# Patient Record
Sex: MALE | Race: BLACK OR AFRICAN AMERICAN | Employment: FULL TIME | URBAN - METROPOLITAN AREA
[De-identification: names, ages, dates, MRNs, and addresses within clinical notes are randomized per-mention and may not be internally consistent; named-entity substitution may affect disease eponyms.]

---

## 2017-01-02 ENCOUNTER — HOSPITAL ENCOUNTER (EMERGENCY)
Age: 57
Discharge: HOME | End: 2017-01-02
Attending: EMERGENCY MEDICINE
Payer: COMMERCIAL

## 2017-01-02 VITALS
OXYGEN SATURATION: 97 % | BODY MASS INDEX: 25.03 KG/M2 | RESPIRATION RATE: 16 BRPM | HEART RATE: 86 BPM | WEIGHT: 195 LBS | TEMPERATURE: 98.1 F | DIASTOLIC BLOOD PRESSURE: 87 MMHG | SYSTOLIC BLOOD PRESSURE: 127 MMHG | HEIGHT: 74 IN

## 2017-01-02 DIAGNOSIS — B34.9 VIRAL ILLNESS: ICD-10-CM

## 2017-01-02 DIAGNOSIS — J06.9 ACUTE UPPER RESPIRATORY INFECTION: Primary | ICD-10-CM

## 2017-01-02 DIAGNOSIS — K29.90 GASTRITIS AND DUODENITIS: ICD-10-CM

## 2017-01-02 PROCEDURE — 99282 EMERGENCY DEPT VISIT SF MDM: CPT

## 2017-01-02 RX ORDER — GUAIFENESIN 600 MG/1
600 TABLET, EXTENDED RELEASE ORAL 2 TIMES DAILY
Qty: 20 TAB | Refills: 0 | Status: SHIPPED | OUTPATIENT
Start: 2017-01-02

## 2017-01-02 RX ORDER — ALBUTEROL SULFATE 90 UG/1
1-2 AEROSOL, METERED RESPIRATORY (INHALATION)
Qty: 1 INHALER | Refills: 1 | Status: SHIPPED | OUTPATIENT
Start: 2017-01-02

## 2017-01-02 NOTE — ED NOTES
Pt for D/C home Pt accepted plan of care and left unit steady gait. Patient (s)  given copy of dc instructions and 2 script(s). Patient(s)  verbalized understanding of instructions and script (s). Patient given a current medication reconciliation form and verbalized understanding of their medications. Patient (s) verbalized understanding of the importance of discussing medications with  his or her physician or clinic when they follow up. Patient alert and oriented and in no acute distress. Pt verbalizes pain scale of 3 out of 10. Patient discharged home ambulatory with self.

## 2017-01-02 NOTE — ED PROVIDER NOTES
Patient is a 55 y.o. male presenting with abdominal pain and cough. The history is provided by the patient. Abdominal Pain    This is a new (Pt reports generalized abdominal cramping, diarrhea, nasal congestion, rhinorrhea x 4 days. Denies fever, chills, n/v, blood in stool. Endorsing eating and drinking today.) problem. The current episode started more than 2 days ago. The problem occurs constantly. The problem has not changed since onset. The pain is located in the generalized abdominal region. The pain is at a severity of 8/10. Associated symptoms include diarrhea and myalgias. Pertinent negatives include no anorexia, no fever, no belching, no flatus, no hematochezia, no melena, no nausea, no vomiting, no constipation, no dysuria, no frequency, no hematuria, no headaches, no arthralgias, no chest pain and no back pain. Nothing worsens the pain. The pain is relieved by nothing. Cough   This is a new problem. The current episode started more than 2 days ago. The problem occurs hourly. The problem has not changed since onset. The cough is productive of sputum. There has been no fever. Associated symptoms include rhinorrhea and myalgias. Pertinent negatives include no chest pain, no chills, no sweats, no weight loss, no eye redness, no ear congestion, no ear pain, no headaches, no sore throat, no shortness of breath, no wheezing, no nausea and no vomiting. He has tried nothing for the symptoms. He is a smoker. His past medical history is significant for asthma. His past medical history does not include pneumonia. Past Medical History:   Diagnosis Date    Asthma     Sleep disorder      sleep apnea       Past Surgical History:   Procedure Laterality Date    Hx orthopaedic       left ankle         History reviewed. No pertinent family history.     Social History     Social History    Marital status: UNKNOWN     Spouse name: N/A    Number of children: N/A    Years of education: N/A     Occupational History  Not on file. Social History Main Topics    Smoking status: Current Some Day Smoker     Packs/day: 0.50    Smokeless tobacco: Not on file    Alcohol use Yes      Comment: weekends    Drug use: No    Sexual activity: Yes     Partners: Female     Birth control/ protection: None     Other Topics Concern    Not on file     Social History Narrative    2/2016: He lives with his wife,  He works as a mailman. ALLERGIES: Review of patient's allergies indicates no known allergies. Review of Systems   Constitutional: Negative for activity change, chills, fatigue, fever and weight loss. HENT: Positive for congestion and rhinorrhea. Negative for dental problem, drooling, ear discharge, ear pain, facial swelling, hearing loss, nosebleeds, postnasal drip, sinus pressure, sore throat, trouble swallowing and voice change. Eyes: Negative. Negative for pain and redness. Respiratory: Positive for cough. Negative for apnea, chest tightness, shortness of breath, wheezing and stridor. Cardiovascular: Negative. Negative for chest pain. Gastrointestinal: Positive for abdominal pain and diarrhea. Negative for anorexia, constipation, flatus, hematochezia, melena, nausea and vomiting. Genitourinary: Negative. Negative for dysuria, frequency and hematuria. Musculoskeletal: Positive for myalgias. Negative for arthralgias and back pain. Skin: Negative. Negative for rash. Neurological: Negative for headaches. Psychiatric/Behavioral: Negative. Vitals:    01/02/17 0954   BP: 127/87   Pulse: 86   Resp: 16   Temp: 98.1 °F (36.7 °C)   SpO2: 97%   Weight: 88.5 kg (195 lb)   Height: 6' 2\" (1.88 m)            Physical Exam   Constitutional: He is oriented to person, place, and time. He appears well-developed and well-nourished. No distress. HENT:   Head: Normocephalic and atraumatic.    Right Ear: Hearing, tympanic membrane, external ear and ear canal normal.   Left Ear: Hearing, tympanic membrane, external ear and ear canal normal.   Nose: Mucosal edema and rhinorrhea present. Right sinus exhibits no maxillary sinus tenderness and no frontal sinus tenderness. Left sinus exhibits no maxillary sinus tenderness and no frontal sinus tenderness. Mouth/Throat: Uvula is midline, oropharynx is clear and moist and mucous membranes are normal. No oropharyngeal exudate, posterior oropharyngeal edema or posterior oropharyngeal erythema. Eyes: Conjunctivae and EOM are normal. Pupils are equal, round, and reactive to light. Neck: Normal range of motion. Neck supple. Pulmonary/Chest: Effort normal. No accessory muscle usage. No respiratory distress. He has no decreased breath sounds. He has no wheezes. He has no rhonchi. He has no rales. Abdominal: Normal appearance. There is generalized tenderness. There is no rigidity, no rebound, no guarding, no CVA tenderness, no tenderness at McBurney's point and negative De's sign. Neurological: He is alert and oriented to person, place, and time. No cranial nerve deficit. Skin: Skin is warm, dry and intact. He is not diaphoretic. No pallor. Psychiatric: He has a normal mood and affect. His speech is normal and behavior is normal. Judgment and thought content normal.   Nursing note and vitals reviewed. MDM  Number of Diagnoses or Management Options  Acute upper respiratory infection:   Gastritis and duodenitis:   Viral illness:   Diagnosis management comments: DDx: URI, viral illness, diarrhea, flu    LABORATORY TESTS:  No results found for this or any previous visit (from the past 12 hour(s)). IMAGING RESULTS:  No orders to display    MEDICATIONS GIVEN:  Medications - No data to display    IMPRESSION:  Acute upper respiratory infection  (primary encounter diagnosis)  Viral illness  Gastritis and duodenitis    PLAN:  1.  Current Discharge Medication List    START taking these medications    guaiFENesin ER (MUCINEX) 600 mg ER tablet  Take 1 Tab by mouth two (2) times a day. Qty: 20 Tab Refills: 0      CONTINUE these medications which have CHANGED    albuterol (PROVENTIL HFA, VENTOLIN HFA, PROAIR HFA) 90 mcg/actuation inhaler   Take 1-2 Puffs by inhalation every four (4) hours as needed for Wheezing. Qty: 1 Inhaler Refills: 1      CONTINUE these medications which have NOT CHANGED    methocarbamol (ROBAXIN) 500 mg tablet  Take 1 Tab by mouth four (4) times daily. Qty: 30 Tab Refills: 0    traMADol (ULTRAM) 50 mg tablet  Take 1 Tab by mouth every six (6) hours as needed for Pain. Max Daily Amount: 200 mg. No driving on medication  Qty: 16 Tab Refills: 0    fluticasone (FLONASE) 50 mcg/actuation nasal spray  2 Sprays by Both Nostrils route nightly. 2. Follow-up Information     Follow up With Details Comments Contact Jessica Hough MD Schedule an appointment as soon as possible for a   visit in 1 week As needed, If symptoms worsen [facility] [address]  [phone]        Return to ED if worse               Patient Progress  Patient progress: stable    ED Course       Procedures      10:38 AM  I have discussed with patient their diagnosis, treatment, and follow up plan. The patient agrees to follow up as outlined in discharge paperwork and also to return to the ED with any worsening.  Alonso Collier PA-C

## 2017-01-02 NOTE — DISCHARGE INSTRUCTIONS
Upper Respiratory Infection (Cold): Care Instructions  Your Care Instructions    An upper respiratory infection, or URI, is an infection of the nose, sinuses, or throat. URIs are spread by coughs, sneezes, and direct contact. The common cold is the most frequent kind of URI. The flu and sinus infections are other kinds of URIs. Almost all URIs are caused by viruses. Antibiotics won't cure them. But you can treat most infections with home care. This may include drinking lots of fluids and taking over-the-counter pain medicine. You will probably feel better in 4 to 10 days. The doctor has checked you carefully, but problems can develop later. If you notice any problems or new symptoms, get medical treatment right away. Follow-up care is a key part of your treatment and safety. Be sure to make and go to all appointments, and call your doctor if you are having problems. It's also a good idea to know your test results and keep a list of the medicines you take. How can you care for yourself at home? · To prevent dehydration, drink plenty of fluids, enough so that your urine is light yellow or clear like water. Choose water and other caffeine-free clear liquids until you feel better. If you have kidney, heart, or liver disease and have to limit fluids, talk with your doctor before you increase the amount of fluids you drink. · Take an over-the-counter pain medicine, such as acetaminophen (Tylenol), ibuprofen (Advil, Motrin), or naproxen (Aleve). Read and follow all instructions on the label. · Before you use cough and cold medicines, check the label. These medicines may not be safe for young children or for people with certain health problems. · Be careful when taking over-the-counter cold or flu medicines and Tylenol at the same time. Many of these medicines have acetaminophen, which is Tylenol. Read the labels to make sure that you are not taking more than the recommended dose.  Too much acetaminophen (Tylenol) can be harmful. · Get plenty of rest.  · Do not smoke or allow others to smoke around you. If you need help quitting, talk to your doctor about stop-smoking programs and medicines. These can increase your chances of quitting for good. When should you call for help? Call 911 anytime you think you may need emergency care. For example, call if:  · You have severe trouble breathing. Call your doctor now or seek immediate medical care if:  · You seem to be getting much sicker. · You have new or worse trouble breathing. · You have a new or higher fever. · You have a new rash. Watch closely for changes in your health, and be sure to contact your doctor if:  · You have a new symptom, such as a sore throat, an earache, or sinus pain. · You cough more deeply or more often, especially if you notice more mucus or a change in the color of your mucus. · You do not get better as expected. Where can you learn more? Go to http://otoniel-maggy.info/. Enter P420 in the search box to learn more about \"Upper Respiratory Infection (Cold): Care Instructions. \"  Current as of: June 30, 2016  Content Version: 11.1  © 3234-8040 [employer]. Care instructions adapted under license by [employer] (which disclaims liability or warranty for this information). If you have questions about a medical condition or this instruction, always ask your healthcare professional. [place] [address] any warranty or liability for your use of this information. Gastritis: Care Instructions  Your Care Instructions    Gastritis is a sore and upset stomach. It happens when something irritates the stomach lining. Many things can cause it. These include an infection such as the flu or something you ate or drank. Medicines or a sore on the lining of the stomach (ulcer) also can cause it. Your belly may bloat and ache. You may belch, vomit, and feel sick to your stomach.   You should be able to relieve the problem by taking medicine. And it may help to change your diet. If gastritis lasts, your doctor may prescribe medicine. Follow-up care is a key part of your treatment and safety. Be sure to make and go to all appointments, and call your doctor if you are having problems. It's also a good idea to know your test results and keep a list of the medicines you take. How can you care for yourself at home? · If your doctor prescribed antibiotics, take them as directed. Do not stop taking them just because you feel better. You need to take the full course of antibiotics. · Be safe with medicines. If your doctor prescribed medicine to decrease stomach acid, take it as directed. Call your doctor if you think you are having a problem with your medicine. · Do not take any other medicine, including over-the-counter pain relievers, without talking to your doctor first.  · If your doctor recommends over-the-counter medicine to reduce stomach acid, such as Pepcid AC, Prilosec, Tagamet HB, or Zantac 75, follow the directions on the label. · Drink plenty of fluids (enough so that your urine is light yellow or clear like water) to prevent dehydration. Choose water and other caffeine-free clear liquids. If you have kidney, heart, or liver disease and have to limit fluids, talk with your doctor before you increase the amount of fluids you drink. · Limit how much alcohol you drink. · Avoid coffee, tea, cola drinks, chocolate, and other foods with caffeine. They increase stomach acid. When should you call for help? Call 911 anytime you think you may need emergency care. For example, call if:  · You vomit blood or what looks like coffee grounds. · You pass maroon or very bloody stools. Call your doctor now or seek immediate medical care if:  · You start breathing fast and have not produced urine in the last 8 hours. · You cannot keep fluids down.   Watch closely for changes in your health, and be sure to contact your doctor if:  · You do not get better as expected. Where can you learn more? Go to http://otoniel-maggy.info/. Enter [id number] in the search box to learn more about \"Gastritis: Care Instructions. \"  Current as of: August 9, 2016  Content Version: 11.1  © 2281-7441 [employer]. Care instructions adapted under license by [employer] (which disclaims liability or warranty for this information). If you have questions about a medical condition or this instruction, always ask your healthcare professional. [address] any warranty or liability for your use of this information.

## 2017-01-02 NOTE — LETTER
[facility] EMERGENCY DEPT 
[address] [facility] [address] [id number] [phone] Work/School Note Date: 1/2/2017 To Whom It May concern: 
 
Alexis Cheung was seen and treated today in the emergency room by the following provider(s): 
Attending Provider: Rosa Maria Alvarado MD 
Physician Assistant: Neha Case PA-C. Alexis Cheung may return to work on 1/4/2017. Sincerely, Neha Case PA-C

## 2017-01-02 NOTE — ED NOTES
Pt c/o N/V/D and nery randolph since Saturday. Emergency Department Nursing Plan of Care       The Nursing Plan of Care is developed from the Nursing assessment and Emergency Department Attending provider initial evaluation. The plan of care may be reviewed in the ED Provider note.     The Plan of Care was developed with the following considerations:   Patient / Family readiness to learn indicated by:verbalized understanding  Persons(s) to be included in education: patient  Barriers to Learning/Limitations:No    Signed     Junior Tad RN    1/2/2017   10:01 AM

## 2017-11-29 ENCOUNTER — HOSPITAL ENCOUNTER (EMERGENCY)
Age: 57
Discharge: HOME | End: 2017-11-29
Attending: EMERGENCY MEDICINE
Payer: COMMERCIAL

## 2017-11-29 ENCOUNTER — APPOINTMENT (OUTPATIENT)
Dept: GENERAL RADIOLOGY | Age: 57
End: 2017-11-29
Attending: EMERGENCY MEDICINE
Payer: COMMERCIAL

## 2017-11-29 VITALS
DIASTOLIC BLOOD PRESSURE: 86 MMHG | OXYGEN SATURATION: 97 % | BODY MASS INDEX: 26.05 KG/M2 | HEIGHT: 74 IN | SYSTOLIC BLOOD PRESSURE: 129 MMHG | RESPIRATION RATE: 18 BRPM | WEIGHT: 203 LBS | HEART RATE: 88 BPM | TEMPERATURE: 98.4 F

## 2017-11-29 DIAGNOSIS — S49.92XA SHOULDER INJURY, LEFT, INITIAL ENCOUNTER: Primary | ICD-10-CM

## 2017-11-29 DIAGNOSIS — S40.012A CONTUSION OF LEFT SHOULDER, INITIAL ENCOUNTER: ICD-10-CM

## 2017-11-29 PROCEDURE — 99283 EMERGENCY DEPT VISIT LOW MDM: CPT

## 2017-11-29 PROCEDURE — A4565 SLINGS: HCPCS

## 2017-11-29 PROCEDURE — 74011250637 HC RX REV CODE- 250/637: Performed by: EMERGENCY MEDICINE

## 2017-11-29 PROCEDURE — 73030 X-RAY EXAM OF SHOULDER: CPT

## 2017-11-29 RX ORDER — IBUPROFEN 800 MG/1
800 TABLET ORAL
Qty: 20 TAB | Refills: 0 | Status: SHIPPED | OUTPATIENT
Start: 2017-11-29 | End: 2017-12-06

## 2017-11-29 RX ORDER — OXYCODONE AND ACETAMINOPHEN 5; 325 MG/1; MG/1
2 TABLET ORAL
Status: COMPLETED | OUTPATIENT
Start: 2017-11-29 | End: 2017-11-29

## 2017-11-29 RX ORDER — IBUPROFEN 400 MG/1
800 TABLET ORAL
Status: COMPLETED | OUTPATIENT
Start: 2017-11-29 | End: 2017-11-29

## 2017-11-29 RX ADMIN — OXYCODONE HYDROCHLORIDE AND ACETAMINOPHEN 2 TABLET: 5; 325 TABLET ORAL at 05:30

## 2017-11-29 RX ADMIN — IBUPROFEN 800 MG: 400 TABLET, FILM COATED ORAL at 05:30

## 2017-11-29 NOTE — LETTER
[facility] EMERGENCY DEPT 
[address] [facility] [address] [address] 
[phone] Work/School Note Date: 11/29/2017 To Whom It May concern: 
 
Duran Ugarte was seen and treated today in the emergency room by the following provider(s): 
Attending Provider: Claribel Holguin MD.   
 
Duran Ugarte may return to work on 12/1/17. Sincerely, Claribel Holguin MD

## 2017-11-29 NOTE — DISCHARGE INSTRUCTIONS
IF YOU STILL HURT IN 2 WEEKS YOU NEED REPEAT XRAYS     Bruises: Care Instructions  Your Care Instructions    Bruises occur when small blood vessels under the skin tear or rupture, most often from a twist, bump, or fall. Blood leaks into tissues under the skin and causes a black-and-blue spot that often turns colors, including purplish black, reddish blue, or yellowish green, as the bruise heals. Bruises hurt, but most are not serious and will go away on their own within 2 to 4 weeks. Sometimes, gravity causes them to spread down the body. A leg bruise usually will take longer to heal than a bruise on the face or arms. Follow-up care is a key part of your treatment and safety. Be sure to make and go to all appointments, and call your doctor if you are having problems. It's also a good idea to know your test results and keep a list of the medicines you take. How can you care for yourself at home? · Take pain medicines exactly as directed. ¨ If the doctor gave you a prescription medicine for pain, take it as prescribed. ¨ If you are not taking a prescription pain medicine, ask your doctor if you can take an over-the-counter medicine. · Put ice or a cold pack on the area for 10 to 20 minutes at a time. Put a thin cloth between the ice and your skin. · If you can, prop up the bruised area on pillows as much as possible for the next few days. Try to keep the bruise above the level of your heart. When should you call for help? Call your doctor now or seek immediate medical care if:  ? · You have signs of infection, such as:  ¨ Increased pain, swelling, warmth, or redness. ¨ Red streaks leading from the bruise. ¨ Pus draining from the bruise. ¨ A fever. ? · You have a bruise on your leg and signs of a blood clot, such as:  ¨ Increasing redness and swelling along with warmth, tenderness, and pain in the bruised area. ¨ Pain in your calf, back of the knee, thigh, or groin.   ¨ Redness and swelling in your leg or groin. ? · Your pain gets worse. ? Watch closely for changes in your health, and be sure to contact your doctor if:  ? · You do not get better as expected. Where can you learn more? Go to http://otoniel-maggy.info/. Enter [phone] [id number] in the search box to learn more about \"Bruises: Care Instructions. \"  Current as of: March 20, 2017  Content Version: 11.4  © 3053-3351 [employer]. Care instructions adapted under license by [employer] (which disclaims liability or warranty for this information). If you have questions about a medical condition or this instruction, always ask your healthcare professional. [place] [address] any warranty or liability for your use of this information. Shoulder Sprain: Care Instructions  Your Care Instructions    A shoulder sprain occurs when you stretch or tear a ligament in your shoulder. Ligaments are tough tissues that connect one bone to another. A sprain can happen during sports, a fall, or projects around the house. Shoulder sprains usually get better with treatment at home. Follow-up care is a key part of your treatment and safety. Be sure to make and go to all appointments, and call your doctor if you are having problems. It's also a good idea to know your test results and keep a list of the medicines you take. How can you care for yourself at home? · Rest and protect your shoulder. Try to stop or reduce any action that causes pain. · If your doctor gave you a sling or immobilizer, wear it as directed. A sling or immobilizer supports your shoulder and may make you more comfortable. · Put ice or a cold pack on your shoulder for 10 to 20 minutes at a time. Try to do this every 1 to 2 hours for the next 3 days (when you are awake) or until the swelling goes down. Put a thin cloth between the ice and your skin. Some doctors suggest alternating between hot and cold. · Be safe with medicines.  Read and follow all instructions on the label. ¨ If the doctor gave you a prescription medicine for pain, take it as prescribed. ¨ If you are not taking a prescription pain medicine, ask your doctor if you can take an over-the-counter medicine. · For the first day or two after an injury, avoid things that might increase swelling, such as hot showers, hot tubs, or hot packs. · After 2 or 3 days, if your swelling is gone, apply a heating pad set on low or a warm cloth to your shoulder. This helps keep your shoulder flexible. Some doctors suggest that you go back and forth between hot and cold. Put a thin cloth between the heating pad and your skin. · Follow your doctor's or physical therapist's directions for exercises. · Return to your usual level of activity slowly. When should you call for help? Call your doctor now or seek immediate medical care if:  ? · Your pain is worse. ? · You cannot move your shoulder. ? · Your arm is cool or pale or changes color below the shoulder. ? · You have tingling, weakness, or numbness in your arm. ? Watch closely for changes in your health, and be sure to contact your doctor if:  ? · You do not get better as expected. Where can you learn more? Go to http://otoniel-maggy.info/. Enter N356 in the search box to learn more about \"Shoulder Sprain: Care Instructions. \"  Current as of: March 21, 2017  Content Version: 11.4  © 6628-3681 [employer]. Care instructions adapted under license by Note (which disclaims liability or warranty for this information). If you have questions about a medical condition or this instruction, always ask your healthcare professional. [address] any warranty or liability for your use of this information.

## 2017-11-29 NOTE — ED NOTES
Patient (s) was given copy of dc instructions and no paper script(s) and one electronic scripts. Patient (s) has verbalized understanding of instructions and script (s). Patient was given a current medication reconciliation form and verbalized understanding of their medications. Patient (s) has verbalized understanding of the importance of discussing medications with  his or her physician or clinic they will be following up with. Patient alert and oriented and in no acute distress. Patient offered wheelchair from treatment area to hospital entrance, patient declined wheelchair. Patient left ED with self. Patient states his wife will pick him up from the lobby.

## 2017-11-29 NOTE — ED NOTES
Pt in ED w/ complaint of L shoulder and L wrist pain and abrasions X 1 day. Pt states he had a fall on yesterday evening. Pt denies hitting his head, LOC, and taking anything for the pain. Pt is A&O X 4 and appears in no distress. Emergency Department Nursing Plan of Care       The Nursing Plan of Care is developed from the Nursing assessment and Emergency Department Attending provider initial evaluation. The plan of care may be reviewed in the ED Provider note.     The Plan of Care was developed with the following considerations:   Patient / Family readiness to learn indicated by:verbalized understanding  Persons(s) to be included in education: patient  Barriers to Learning/Limitations:No    Signed     Emma Santos RN    11/29/2017   5:37 AM

## 2017-11-29 NOTE — ED PROVIDER NOTES
EMERGENCY DEPARTMENT HISTORY AND PHYSICAL EXAM      Date: 11/29/2017  Patient Name: Britni Kee    History of Presenting Illness     Chief Complaint   Patient presents with    Shoulder Pain     Patient tripped on sidewalk and fell, c/o left shoulder pain       History Provided By: Patient    HPI: Britni Kee, 65 y.o. male with PMHx significant for asthma / sleep apnea, presents ambulatory to the ED with cc of sudden onset L shoulder pain that began after a mechanical ground level fall that occurred at approximately 1800 yesterday. He also reports two small superficial abrasions, one over the dorsal L wrist and the other over the proximal L shoulder. Pt reports that he tripped and fell while walking on the sidewalk and landed directly on his L shoulder. He denies any history of injury to the shoulder. Pt denies taking any OTC analgesics or anti-inflammatories PTA. He states that his pain is non-radiating and is exacerbated with rotation of the shoulder. Pt notes that he is right hand dominant and states that he works as a . Pt specifically denies nausea, vomiting, fever, chills, CP, or SOB. PCP: Charmayne Come, MD    There are no other complaints, changes, or physical findings at this time. Current Outpatient Prescriptions   Medication Sig Dispense Refill    ibuprofen (MOTRIN) 800 mg tablet Take 1 Tab by mouth every six (6) hours as needed for Pain for up to 7 days. 20 Tab 0    albuterol (PROVENTIL HFA, VENTOLIN HFA, PROAIR HFA) 90 mcg/actuation inhaler Take 1-2 Puffs by inhalation every four (4) hours as needed for Wheezing. 1 Inhaler 1    guaiFENesin ER (MUCINEX) 600 mg ER tablet Take 1 Tab by mouth two (2) times a day. 20 Tab 0    methocarbamol (ROBAXIN) 500 mg tablet Take 1 Tab by mouth four (4) times daily. 30 Tab 0    traMADol (ULTRAM) 50 mg tablet Take 1 Tab by mouth every six (6) hours as needed for Pain. Max Daily Amount: 200 mg.  No driving on medication 16 Tab 0    fluticasone (FLONASE) 50 mcg/actuation nasal spray 2 Sprays by Both Nostrils route nightly. Past History     Past Medical History:  Past Medical History:   Diagnosis Date    Asthma     Sleep disorder     sleep apnea       Past Surgical History:  Past Surgical History:   Procedure Laterality Date    HX ORTHOPAEDIC      left ankle       Family History:  History reviewed. No pertinent family history. Social History:  Social History   Substance Use Topics    Smoking status: Current Some Day Smoker     Packs/day: 0.50    Smokeless tobacco: Never Used    Alcohol use Yes      Comment: weekends       Allergies:  No Known Allergies      Review of Systems   Review of Systems   Constitutional: Negative. Negative for fever. HENT: Negative. Negative for drooling, facial swelling and trouble swallowing. Eyes: Negative. Negative for discharge and redness. Respiratory: Negative. Negative for chest tightness, shortness of breath and wheezing. Cardiovascular: Negative. Negative for chest pain. Gastrointestinal: Negative. Negative for abdominal distention, abdominal pain, constipation, diarrhea, nausea and vomiting. Endocrine: Negative. Genitourinary: Negative. Negative for difficulty urinating and dysuria. Musculoskeletal: Positive for arthralgias (L shoulder). Negative for myalgias. Skin: Positive for wound (superficial abrasions, L wrist and L shoulder). Negative for color change and rash. Allergic/Immunologic: Negative. Neurological: Negative. Negative for syncope, facial asymmetry and speech difficulty. Hematological: Negative. Psychiatric/Behavioral: Negative. Negative for agitation and confusion. All other systems reviewed and are negative. Physical Exam   Physical Exam   Constitutional: He is oriented to person, place, and time. He appears well-developed and well-nourished. HENT:   Head: Normocephalic and atraumatic.    Eyes: Conjunctivae and EOM are normal.   Neck: Neck supple. Cardiovascular: Normal rate, regular rhythm and intact distal pulses. Pulmonary/Chest: No accessory muscle usage. No respiratory distress. Abdominal: Soft. Normal appearance. There is no tenderness. Musculoskeletal:   Abduction of the L shoulder limited secondary to pain. Tenderness over the L AC joint with no swelling. No significant swelling or bony tenderness of the L wrist. Distal radial ulnar nerve intact. No appreciable asymmetry between the L and R shoulders. Neurological: He is alert and oriented to person, place, and time. Skin: Skin is warm and dry. Superficial abrasion over the dorsal aspect of the L wrist and over the L AC joint. Psychiatric: He has a normal mood and affect. His behavior is normal. Thought content normal.   Nursing note and vitals reviewed. Diagnostic Study Results     Labs -   No results found for this or any previous visit (from the past 12 hour(s)). Radiologic Studies -   XR SHOULDER LT AP/LAT MIN 2 V   Final Result        Study Result      EXAM:  XR SHOULDER LT AP/LAT MIN 2 V     INDICATION:   Pain after fall.     COMPARISON: None.     FINDINGS: Three views of the left shoulder demonstrate no fracture, dislocation  or other acute abnormality.     IMPRESSION  IMPRESSION:  No acute abnormality. Medical Decision Making   I am the first provider for this patient. I reviewed the vital signs, available nursing notes, past medical history, past surgical history, family history and social history. Vital Signs-Reviewed the patient's vital signs. Patient Vitals for the past 12 hrs:   Temp Pulse Resp BP SpO2   11/29/17 0519 98.4 °F (36.9 °C) 88 18 129/86 97 %       Pulse Oximetry Analysis - 97% on RA    Records Reviewed: Nursing Notes and Old Medical Records    Provider Notes (Medical Decision Making):   DDx sprain strain fracture, abrasion    ED Course:   Initial assessment performed.  The patients presenting problems have been discussed, and they are in agreement with the care plan formulated and outlined with them. I have encouraged them to ask questions as they arise throughout their visit. Progress Note:  5:53 AM  Pt updated on imaging results. He states that his pain has improved and agrees with the current plan of care, states that he is ready for discharge. Written by Josias Silveira, ED Scribe, as dictated by Vickie Perea MD.    Disposition:  DISCHARGE NOTE  5:53 AM  The patient has been re-evaluated and is ready for discharge. Reviewed available results with patient. Counseled pt on diagnosis and care plan. Pt has expressed understanding, and all questions have been answered. Pt agrees with plan and agrees to F/U as recommended, or return to the ED if their sxs worsen. Discharge instructions have been provided and explained to the pt, along with reasons to return to the ED. This note is prepared by Lori Gaytan, acting as Scribe for Vickie Perea MD.    Vickie Perea MD: The scribe's documentation has been prepared under my direction and personally reviewed by me in its entirety. I confirm that the note above accurately reflects all work, treatment, procedures, and medical decision making performed by me. PLAN:  1. Current Discharge Medication List      START taking these medications    Details   ibuprofen (MOTRIN) 800 mg tablet Take 1 Tab by mouth every six (6) hours as needed for Pain for up to 7 days. Qty: 20 Tab, Refills: 0           2. Follow-up Information     Follow up With Details Comments Contact Jesus Manuel Lynch MD Schedule an appointment as soon as possible for a visit  [facility] [id number]  [phone]          Return to ED if worse     Diagnosis     Clinical Impression:   1. Shoulder injury, left, initial encounter    2.  Contusion of left shoulder, initial encounter

## 2021-10-09 ENCOUNTER — APPOINTMENT (OUTPATIENT)
Dept: GENERAL RADIOLOGY | Age: 61
End: 2021-10-09
Attending: EMERGENCY MEDICINE
Payer: COMMERCIAL

## 2021-10-09 ENCOUNTER — HOSPITAL ENCOUNTER (EMERGENCY)
Age: 61
Discharge: HOME | End: 2021-10-10
Attending: EMERGENCY MEDICINE
Payer: COMMERCIAL

## 2021-10-09 VITALS
SYSTOLIC BLOOD PRESSURE: 132 MMHG | HEIGHT: 74 IN | DIASTOLIC BLOOD PRESSURE: 78 MMHG | WEIGHT: 236.33 LBS | OXYGEN SATURATION: 98 % | BODY MASS INDEX: 30.33 KG/M2 | TEMPERATURE: 99.2 F | HEART RATE: 103 BPM | RESPIRATION RATE: 18 BRPM

## 2021-10-09 DIAGNOSIS — R09.82 POSTNASAL DRIP: ICD-10-CM

## 2021-10-09 DIAGNOSIS — J01.00 ACUTE NON-RECURRENT MAXILLARY SINUSITIS: Primary | ICD-10-CM

## 2021-10-09 DIAGNOSIS — R05.9 COUGH: ICD-10-CM

## 2021-10-09 LAB
ALBUMIN SERPL-MCNC: 2.9 G/DL (ref 3.5–5)
ALBUMIN/GLOB SERPL: 0.5 {RATIO} (ref 1.1–2.2)
ALP SERPL-CCNC: 106 U/L (ref 45–117)
ALT SERPL-CCNC: 30 U/L (ref 12–78)
ANION GAP SERPL CALC-SCNC: 2 MMOL/L (ref 5–15)
AST SERPL-CCNC: 33 U/L (ref 15–37)
BASOPHILS # BLD: 0 K/UL (ref 0–0.1)
BASOPHILS NFR BLD: 0 % (ref 0–1)
BILIRUB SERPL-MCNC: 0.2 MG/DL (ref 0.2–1)
BUN SERPL-MCNC: 11 MG/DL (ref 6–20)
BUN/CREAT SERPL: 11 (ref 12–20)
CALCIUM SERPL-MCNC: 8.4 MG/DL (ref 8.5–10.1)
CHLORIDE SERPL-SCNC: 103 MMOL/L (ref 97–108)
CO2 SERPL-SCNC: 32 MMOL/L (ref 21–32)
CREAT SERPL-MCNC: 1 MG/DL (ref 0.7–1.3)
DIFFERENTIAL METHOD BLD: ABNORMAL
EOSINOPHIL # BLD: 0.5 K/UL (ref 0–0.4)
EOSINOPHIL NFR BLD: 4 % (ref 0–7)
ERYTHROCYTE [DISTWIDTH] IN BLOOD BY AUTOMATED COUNT: 19 % (ref 11.5–14.5)
GLOBULIN SER CALC-MCNC: 5.4 G/DL (ref 2–4)
GLUCOSE SERPL-MCNC: 172 MG/DL (ref 65–100)
HCT VFR BLD AUTO: 32.2 % (ref 36.6–50.3)
HGB BLD-MCNC: 9.2 G/DL (ref 12.1–17)
IMM GRANULOCYTES # BLD AUTO: 0.1 K/UL (ref 0–0.04)
IMM GRANULOCYTES NFR BLD AUTO: 1 % (ref 0–0.5)
LYMPHOCYTES # BLD: 2 K/UL (ref 0.8–3.5)
LYMPHOCYTES NFR BLD: 15 % (ref 12–49)
MCH RBC QN AUTO: 20.1 PG (ref 26–34)
MCHC RBC AUTO-ENTMCNC: 28.6 G/DL (ref 30–36.5)
MCV RBC AUTO: 70.5 FL (ref 80–99)
MONOCYTES # BLD: 1.2 K/UL (ref 0–1)
MONOCYTES NFR BLD: 9 % (ref 5–13)
NEUTS SEG # BLD: 9.4 K/UL (ref 1.8–8)
NEUTS SEG NFR BLD: 71 % (ref 32–75)
NRBC # BLD: 0 K/UL (ref 0–0.01)
NRBC BLD-RTO: 0 PER 100 WBC
PLATELET # BLD AUTO: 302 K/UL (ref 150–400)
PMV BLD AUTO: 9 FL (ref 8.9–12.9)
POTASSIUM SERPL-SCNC: 3.7 MMOL/L (ref 3.5–5.1)
PROT SERPL-MCNC: 8.3 G/DL (ref 6.4–8.2)
RBC # BLD AUTO: 4.57 M/UL (ref 4.1–5.7)
RBC MORPH BLD: ABNORMAL
SODIUM SERPL-SCNC: 137 MMOL/L (ref 136–145)
TROPONIN-HIGH SENSITIVITY: 13 NG/L (ref 0–76)
WBC # BLD AUTO: 13.2 K/UL (ref 4.1–11.1)

## 2021-10-09 PROCEDURE — 99284 EMERGENCY DEPT VISIT MOD MDM: CPT

## 2021-10-09 PROCEDURE — 93005 ELECTROCARDIOGRAM TRACING: CPT

## 2021-10-09 PROCEDURE — 71045 X-RAY EXAM CHEST 1 VIEW: CPT

## 2021-10-09 PROCEDURE — 84484 ASSAY OF TROPONIN QUANT: CPT

## 2021-10-09 PROCEDURE — 36415 COLL VENOUS BLD VENIPUNCTURE: CPT

## 2021-10-09 PROCEDURE — 85025 COMPLETE CBC W/AUTO DIFF WBC: CPT

## 2021-10-09 PROCEDURE — 80053 COMPREHEN METABOLIC PANEL: CPT

## 2021-10-09 RX ORDER — PREDNISONE 10 MG/1
TABLET ORAL
Qty: 21 TABLET | Refills: 0 | Status: SHIPPED | OUTPATIENT
Start: 2021-10-09

## 2021-10-09 RX ORDER — AZITHROMYCIN 250 MG/1
TABLET, FILM COATED ORAL
Qty: 6 TABLET | Refills: 0 | Status: SHIPPED | OUTPATIENT
Start: 2021-10-09

## 2021-10-10 LAB
ATRIAL RATE: 101 BPM
CALCULATED P AXIS, ECG09: 58 DEGREES
CALCULATED R AXIS, ECG10: 9 DEGREES
CALCULATED T AXIS, ECG11: 70 DEGREES
DIAGNOSIS, 93000: NORMAL
P-R INTERVAL, ECG05: 144 MS
Q-T INTERVAL, ECG07: 384 MS
QRS DURATION, ECG06: 86 MS
QTC CALCULATION (BEZET), ECG08: 497 MS
VENTRICULAR RATE, ECG03: 101 BPM

## 2021-10-10 NOTE — DISCHARGE INSTRUCTIONS
It was a pleasure taking care of you in our Emergency Department today. We know that when you come to [address] [place] [address], you are entrusting us with your health, comfort, and safety. Our physicians and nurses honor that trust, and truly appreciate the opportunity to care for you and your loved ones. We also value your feedback. If you receive a survey about your Emergency Department experience today, please fill it out. We care about our patients' feedback, and we listen to what you have to say. Please read over your discharge instructions as these contain pertinent information to help you in the healing process. These instructions include a list of prescriptions you were given today. Follow-up information is also noted on your discharge papers. There are attached instructions and information pertaining to the reason why you were seen in the emergency department today. These discharge instructions may not be for exactly why you were here, but may be the closest available instructions that we have. These include important advice for things that you can do at home to feel better, and reasons to return to the emergency department. The evaluation and treatment you received in the emergency department is not always definitive care. If follow-up with your primary care doctor or specialist was recommended, it is important that you make these appointments for follow-up care. You may need further testing, procedures, and/or medications to help you feel better. Further tests may be required that are not available in the emergency department. Failure to make these follow-up appointments may jeopardize your health. The emergency department is here for emergent stabilization and evaluation of life and limb threatening illness and/or injuries.   Further care through a specialist or primary care doctor may be required to assist in your healing and complete your treatment and/or evaluation. We may not always be able to make a diagnosis in the emergency department, or things may change that will alter your diagnosis. Our primary goal is to ensure that nothing serious is occurring and that you are stable to continue your treatment and evaluation at home as an outpatient. Of course, if things change, and you feel worse, you are always encouraged to return to the emergency department for re-evaluation. Lab Results Today:  Recent Results (from the past 8 hour(s))   CBC WITH AUTOMATED DIFF    Collection Time: 10/09/21 10:27 PM   Result Value Ref Range    WBC 13.2 (H) 4.1 - 11.1 K/uL    RBC 4.57 4.10 - 5.70 M/uL    HGB 9.2 (L) 12.1 - 17.0 g/dL    HCT 32.2 (L) 36.6 - 50.3 %    MCV 70.5 (L) 80.0 - 99.0 FL    MCH 20.1 (L) 26.0 - 34.0 PG    MCHC 28.6 (L) 30.0 - 36.5 g/dL    RDW 19.0 (H) 11.5 - 14.5 %    PLATELET 768 986 - 079 K/uL    MPV 9.0 8.9 - 12.9 FL    NRBC 0.0 0  WBC    ABSOLUTE NRBC 0.00 0.00 - 0.01 K/uL    NEUTROPHILS 71 32 - 75 %    LYMPHOCYTES 15 12 - 49 %    MONOCYTES 9 5 - 13 %    EOSINOPHILS 4 0 - 7 %    BASOPHILS 0 0 - 1 %    IMMATURE GRANULOCYTES 1 (H) 0.0 - 0.5 %    ABS. NEUTROPHILS 9.4 (H) 1.8 - 8.0 K/UL    ABS. LYMPHOCYTES 2.0 0.8 - 3.5 K/UL    ABS. MONOCYTES 1.2 (H) 0.0 - 1.0 K/UL    ABS. EOSINOPHILS 0.5 (H) 0.0 - 0.4 K/UL    ABS. BASOPHILS 0.0 0.0 - 0.1 K/UL    ABS. IMM.  GRANS. 0.1 (H) 0.00 - 0.04 K/UL    DF SMEAR SCANNED      RBC COMMENTS ANISOCYTOSIS  1+        RBC COMMENTS MICROCYTOSIS  1+        RBC COMMENTS HYPOCHROMIA  2+       METABOLIC PANEL, COMPREHENSIVE    Collection Time: 10/09/21 10:27 PM   Result Value Ref Range    Sodium 137 136 - 145 mmol/L    Potassium 3.7 3.5 - 5.1 mmol/L    Chloride 103 97 - 108 mmol/L    CO2 32 21 - 32 mmol/L    Anion gap 2 (L) 5 - 15 mmol/L    Glucose 172 (H) 65 - 100 mg/dL    BUN 11 6 - 20 MG/DL    Creatinine 1.00 0.70 - 1.30 MG/DL    BUN/Creatinine ratio 11 (L) 12 - 20      GFR est AA >60 >60 ml/min/1.73m2    GFR est non-AA >60 >60 ml/min/1.73m2    Calcium 8.4 (L) 8.5 - 10.1 MG/DL    Bilirubin, total 0.2 0.2 - 1.0 MG/DL    ALT (SGPT) 30 12 - 78 U/L    AST (SGOT) 33 15 - 37 U/L    Alk. phosphatase 106 45 - 117 U/L    Protein, total 8.3 (H) 6.4 - 8.2 g/dL    Albumin 2.9 (L) 3.5 - 5.0 g/dL    Globulin 5.4 (H) 2.0 - 4.0 g/dL    A-G Ratio 0.5 (L) 1.1 - 2.2     TROPONIN-HIGH SENSITIVITY    Collection Time: 10/09/21 10:27 PM   Result Value Ref Range    Troponin-High Sensitivity 13 0 - 76 ng/L   EKG, 12 LEAD, INITIAL    Collection Time: 10/09/21 10:30 PM   Result Value Ref Range    Ventricular Rate 101 BPM    Atrial Rate 101 BPM    P-R Interval 144 ms    QRS Duration 86 ms    Q-T Interval 384 ms    QTC Calculation (Bezet) 497 ms    Calculated P Axis 58 degrees    Calculated R Axis 9 degrees    Calculated T Axis 70 degrees    Diagnosis       Sinus tachycardia  Possible Left atrial enlargement  Nonspecific T wave abnormality  When compared with ECG of 16-FEB-2016 17:55,  No significant change was found          Radiology Results Today:  XR CHEST PORT    Result Date: 10/9/2021  No evidence of acute cardiopulmonary process.

## 2021-10-10 NOTE — ED PROVIDER NOTES
EMERGENCY DEPARTMENT HISTORY AND PHYSICAL EXAM      Date: 10/9/2021  Patient Name: Macey Dyson    History of Presenting Illness     Chief Complaint   Patient presents with    Flu Like Symptoms     ED visit d/t flu like sxs - (L) chest pain intermittent / sore throat / (R) ear pain / bodyaches / coughing - received covid 19 vaccine - Denies fever / N / V / D        History Provided By: Patient    HPI: Macey Dyson, 62 y.o. male  presents to the ED with cc of sinus congestion, sore throat, cough. Patient states he has had symptoms for about 2 to 3 weeks. Denies any fevers or chills. He has had some discomfort in the left upper chest.  He was vaccinated for COVID-19. Denies any nausea vomiting or diarrhea. Patient does have a history of sleep apnea and asthma. Also reports bilateral ear pain. Past History     Past Medical History:  Past Medical History:   Diagnosis Date    Asthma     Sleep disorder     sleep apnea       Past Surgical History:  Past Surgical History:   Procedure Laterality Date    HX ORTHOPAEDIC      left ankle       Medications:  No current facility-administered medications on file prior to encounter. Current Outpatient Medications on File Prior to Encounter   Medication Sig Dispense Refill    albuterol (PROVENTIL HFA, VENTOLIN HFA, PROAIR HFA) 90 mcg/actuation inhaler Take 1-2 Puffs by inhalation every four (4) hours as needed for Wheezing. 1 Inhaler 1    guaiFENesin ER (MUCINEX) 600 mg ER tablet Take 1 Tab by mouth two (2) times a day. 20 Tab 0    methocarbamol (ROBAXIN) 500 mg tablet Take 1 Tab by mouth four (4) times daily. 30 Tab 0    traMADol (ULTRAM) 50 mg tablet Take 1 Tab by mouth every six (6) hours as needed for Pain. Max Daily Amount: 200 mg. No driving on medication 16 Tab 0    fluticasone (FLONASE) 50 mcg/actuation nasal spray 2 Sprays by Both Nostrils route nightly. Family History:  History reviewed. No pertinent family history.     Social History:  Social History     Tobacco Use    Smoking status: Current Some Day Smoker     Packs/day: 0.50    Smokeless tobacco: Never Used   Substance Use Topics    Alcohol use: Yes     Comment: weekends    Drug use: No       Allergies:  No Known Allergies    All the above components of the past  history are auto-populated from the electronic record. They have been reviewed and the patient has been interviewed for any pertinent past history that pertains to the patient's chief complaint and reason for visit. Not all pre-populated components may be accurate at the time this note was generated. Review of Systems   Review of Systems   Constitutional: Negative for chills and fever. HENT: Positive for congestion, ear pain, postnasal drip, rhinorrhea, sinus pressure and sore throat. Negative for trouble swallowing. Eyes: Negative for visual disturbance. Respiratory: Negative for cough, chest tightness and shortness of breath. Cardiovascular: Positive for chest pain. Negative for palpitations. Gastrointestinal: Negative for abdominal pain, blood in stool, constipation, diarrhea, nausea and vomiting. Genitourinary: Negative for decreased urine volume, difficulty urinating, dysuria and frequency. Musculoskeletal: Negative for back pain and neck pain. Skin: Negative for color change and rash. Neurological: Negative for dizziness, weakness, light-headedness and headaches. Physical Exam   Physical Exam  Vitals and nursing note reviewed. Constitutional:       General: He is not in acute distress. Appearance: He is well-developed. He is not ill-appearing. HENT:      Head: Normocephalic. Right Ear: Tympanic membrane normal.      Left Ear: Tympanic membrane normal.      Nose: Mucosal edema, congestion and rhinorrhea present. Right Turbinates: Swollen. Left Turbinates: Swollen. Right Sinus: Maxillary sinus tenderness present. Left Sinus: Maxillary sinus tenderness present.    Eyes: Conjunctiva/sclera: Conjunctivae normal.   Cardiovascular:      Rate and Rhythm: Regular rhythm. Tachycardia present. Pulmonary:      Effort: Pulmonary effort is normal. No accessory muscle usage or respiratory distress. Abdominal:      General: There is no distension. Musculoskeletal:      Cervical back: Normal range of motion. Skin:     General: Skin is warm and dry. Neurological:      Mental Status: He is alert and oriented to person, place, and time. Due to the COVID-19 pandemic, in order to reduce the spread and transmission of the virus, some basic elements of the physical exam have been deferred to reduce direct or close contact with the patient unless they are deemed to be absolutely necessary, regardless of whether the virus is highly suspected or not. Diagnostic Study Results     Labs -     Recent Results (from the past 24 hour(s))   CBC WITH AUTOMATED DIFF    Collection Time: 10/09/21 10:27 PM   Result Value Ref Range    WBC 13.2 (H) 4.1 - 11.1 K/uL    RBC 4.57 4.10 - 5.70 M/uL    HGB 9.2 (L) 12.1 - 17.0 g/dL    HCT 32.2 (L) 36.6 - 50.3 %    MCV 70.5 (L) 80.0 - 99.0 FL    MCH 20.1 (L) 26.0 - 34.0 PG    MCHC 28.6 (L) 30.0 - 36.5 g/dL    RDW 19.0 (H) 11.5 - 14.5 %    PLATELET 588 822 - 687 K/uL    MPV 9.0 8.9 - 12.9 FL    NRBC 0.0 0  WBC    ABSOLUTE NRBC 0.00 0.00 - 0.01 K/uL    NEUTROPHILS 71 32 - 75 %    LYMPHOCYTES 15 12 - 49 %    MONOCYTES 9 5 - 13 %    EOSINOPHILS 4 0 - 7 %    BASOPHILS 0 0 - 1 %    IMMATURE GRANULOCYTES 1 (H) 0.0 - 0.5 %    ABS. NEUTROPHILS 9.4 (H) 1.8 - 8.0 K/UL    ABS. LYMPHOCYTES 2.0 0.8 - 3.5 K/UL    ABS. MONOCYTES 1.2 (H) 0.0 - 1.0 K/UL    ABS. EOSINOPHILS 0.5 (H) 0.0 - 0.4 K/UL    ABS. BASOPHILS 0.0 0.0 - 0.1 K/UL    ABS. IMM.  GRANS. 0.1 (H) 0.00 - 0.04 K/UL    DF SMEAR SCANNED      RBC COMMENTS ANISOCYTOSIS  1+        RBC COMMENTS MICROCYTOSIS  1+        RBC COMMENTS HYPOCHROMIA  2+       METABOLIC PANEL, COMPREHENSIVE    Collection Time: 10/09/21 10:27 PM   Result Value Ref Range    Sodium 137 136 - 145 mmol/L    Potassium 3.7 3.5 - 5.1 mmol/L    Chloride 103 97 - 108 mmol/L    CO2 32 21 - 32 mmol/L    Anion gap 2 (L) 5 - 15 mmol/L    Glucose 172 (H) 65 - 100 mg/dL    BUN 11 6 - 20 MG/DL    Creatinine 1.00 0.70 - 1.30 MG/DL    BUN/Creatinine ratio 11 (L) 12 - 20      GFR est AA >60 >60 ml/min/1.73m2    GFR est non-AA >60 >60 ml/min/1.73m2    Calcium 8.4 (L) 8.5 - 10.1 MG/DL    Bilirubin, total 0.2 0.2 - 1.0 MG/DL    ALT (SGPT) 30 12 - 78 U/L    AST (SGOT) 33 15 - 37 U/L    Alk. phosphatase 106 45 - 117 U/L    Protein, total 8.3 (H) 6.4 - 8.2 g/dL    Albumin 2.9 (L) 3.5 - 5.0 g/dL    Globulin 5.4 (H) 2.0 - 4.0 g/dL    A-G Ratio 0.5 (L) 1.1 - 2.2     TROPONIN-HIGH SENSITIVITY    Collection Time: 10/09/21 10:27 PM   Result Value Ref Range    Troponin-High Sensitivity 13 0 - 76 ng/L   EKG, 12 LEAD, INITIAL    Collection Time: 10/09/21 10:30 PM   Result Value Ref Range    Ventricular Rate 101 BPM    Atrial Rate 101 BPM    P-R Interval 144 ms    QRS Duration 86 ms    Q-T Interval 384 ms    QTC Calculation (Bezet) 497 ms    Calculated P Axis 58 degrees    Calculated R Axis 9 degrees    Calculated T Axis 70 degrees    Diagnosis       Sinus tachycardia  Possible Left atrial enlargement  Nonspecific T wave abnormality  When compared with ECG of 16-FEB-2016 17:55,  No significant change was found         Radiologic Studies -   XR CHEST PORT   Final Result   No evidence of acute cardiopulmonary process. CT Results  (Last 48 hours)    None        CXR Results  (Last 48 hours)               10/09/21 2157  XR CHEST PORT Final result    Impression:  No evidence of acute cardiopulmonary process. Narrative:  INDICATION: Chest pain       COMPARISON: February 16, 2016       FINDINGS: AP portable imaging of the chest performed at 9:54 PM demonstrates a   stable cardiomediastinal silhouette. The thoracic aorta remains tortuous.  The   lungs are clear bilaterally. No significant osseous abnormalities are seen. Medical Decision Making     I reviewed the vital signs, available nursing notes, past medical history, past surgical history, family history and social history. Vital Signs-I have reviewed the vital signs that have been made available during the patient's emergency department visit. The vital signs auto-populated below are obtained mostly by electronic means through monitoring devices that have been downloaded into the patient's chart by the nursing staff. Some vital signs are not downloaded into the chart until after the patient has been discharged and this note has been completed, therefore some vital signs may not be available to the physician for review prior to patient's discharge or admission. The physician has reviewed the patient's triage vital signs, monitored the electronic monitoring devices remotely for any significant vital sign abnormalities, and have reviewed vital signs prior to discharge. Some vital signs reviewed at bedside or remotely utilizing electronic monitoring devices may be different than the vital signs downloaded into the electronic medical record. Some vital signs may be erroneous and inaccurate since they are obtained by electronic monitoring devices, and not all vital signs are verified for accuracy by nursing staff prior to downloading into the patient's chart. Patient Vitals for the past 24 hrs:   Temp Pulse Resp BP SpO2   10/09/21 2123 99.2 °F (37.3 °C) (!) 103 18 132/78 98 %         Records Reviewed: Nursing notes for today's visit have been reviewed. I have also reviewed most recent medical records pertinent to today's complaints, if available in our medical record system. I have also reviewed all labs and imaging results from previous results in comparison to results obtained today. If an EKG was obtained today, it has been compared to previous EKGs, if available.   If arriving via EMS, the EMS report has been reviewed if made available to us within the patient's time in the emergency department. Provider Notes (Medical Decision Making):   Patient presents with 2 to 3 weeks of sinus congestion associated with postnasal drip that is given him a cough and sore throat. He does have discomfort in the left upper chest that is likely musculoskeletal from the cough. EKG is nonischemic and cardiac enzymes are within normal ranges. Given long duration of his symptoms would recommend starting him on antibiotics. He was outside the expected duration of a viral illness. He does have a mild leukocytosis but is afebrile here. Recommend follow-up with primary care. We will start him on a steroid course as well. ED Course:   Initial assessment performed. The patients presenting problems have been discussed, and they are in agreement with the care plan formulated and outlined with them. I have encouraged them to ask questions as they arise throughout their visit.          Orders Placed This Encounter    XR CHEST PORT    CBC WITH AUTOMATED DIFF    METABOLIC PANEL, COMPREHENSIVE    TROPONIN-HIGH SENSITIVITY    CHEST PAIN PANEL TRACKING (DO NOT DESELECT)    CARDIAC MONITOR - ED ONLY    OXYGEN CANNULA    VITAL SIGNS Per Protocol    MEASURE HEIGHT    WEIGH PATIENT    OXYGEN CANNULA Liters per minute: 2; Indications for O2 therapy: CHEST PAIN CONTINUOUS STAT    EKG, 12 LEAD, INITIAL    SALINE LOCK IV ONE TIME STAT    azithromycin (Zithromax Z-Glenn) 250 mg tablet    predniSONE (STERAPRED DS) 10 mg dose pack       EKG    Date/Time: 10/10/2021 10:30 PM  Performed by: Missael Paige MD  Authorized by: Missael Paige MD     ECG reviewed by ED Physician in the absence of a cardiologist: yes    Rate:     ECG rate:  101    ECG rate assessment: tachycardic    Rhythm:     Rhythm: sinus tachycardia    Ectopy:     Ectopy: none    QRS:     QRS axis:  Normal  Conduction:     Conduction: normal    ST segments:     ST segments:  Normal  T waves:     T waves: non-specific            Critical Care Time:   0    Disposition:  Discharge    The patient's emergency department evaluation is now complete. I have reviewed all labs, imaging, and pertinent information. I have discussed all results with the patient and/or family. Based on our evaluation today I do believe that the patient is safe to be discharged home. The patient has been provided with at home instructions that are pertinent to their complaint today, although these may not be specific to the exact diagnosis. I have reviewed the patient's home medications and attempted to reconcile if not already done so by pharmacy or nursing staff. I have discussed all new prescriptions with the patient. The patient has been encouraged to follow-up with primary care doctor and/or specialist, and these have been discussed with the patient. The patient has been advised that they may return to the emergency department if they have any worsening symptoms and or new symptoms that are of concern to them. Verbal discharge instructions may have also been provided to the patient that may not be specifically contained in the written discharge instructions. The patient has been given opportunity to ask questions prior to discharge. PLAN:  1. Current Discharge Medication List      START taking these medications    Details   azithromycin (Zithromax Z-Glenn) 250 mg tablet Z-Glenn use as directed  Qty: 6 Tablet, Refills: 0  Start date: 10/9/2021      predniSONE (STERAPRED DS) 10 mg dose pack 6-day Dosepak use as directed  Qty: 21 Tablet, Refills: 0  Start date: 10/9/2021           2.    Follow-up Information     Follow up With Specialties Details Why Contact Info    Lesli Rain MD Internal Medicine Schedule an appointment as soon as possible for a visit in 1 week  [facility] [place] [address] [phone]          Return to ED if worse     Diagnosis Clinical Impression:   1. Acute non-recurrent maxillary sinusitis    2. Postnasal drip    3.  Cough